# Patient Record
Sex: FEMALE | Race: WHITE | URBAN - METROPOLITAN AREA
[De-identification: names, ages, dates, MRNs, and addresses within clinical notes are randomized per-mention and may not be internally consistent; named-entity substitution may affect disease eponyms.]

---

## 2023-11-01 ENCOUNTER — OFFICE (OUTPATIENT)
Dept: URBAN - METROPOLITAN AREA CLINIC 103 | Facility: CLINIC | Age: 73
Setting detail: OPHTHALMOLOGY
End: 2023-11-01
Payer: MEDICARE

## 2023-11-01 DIAGNOSIS — H25.13: ICD-10-CM

## 2023-11-01 DIAGNOSIS — H02.834: ICD-10-CM

## 2023-11-01 DIAGNOSIS — H02.831: ICD-10-CM

## 2023-11-01 DIAGNOSIS — H16.223: ICD-10-CM

## 2023-11-01 DIAGNOSIS — H35.033: ICD-10-CM

## 2023-11-01 PROCEDURE — 92014 COMPRE OPH EXAM EST PT 1/>: CPT | Performed by: OPHTHALMOLOGY

## 2023-11-01 ASSESSMENT — LID POSITION - DERMATOCHALASIS
OD_DERMATOCHALASIS: RUL
OS_DERMATOCHALASIS: LUL

## 2023-11-01 ASSESSMENT — REFRACTION_CURRENTRX
OS_CYLINDER: -1.25
OS_AXIS: 083
OD_AXIS: 095
OS_SPHERE: +1.75
OD_ADD: +2.75
OS_OVR_VA: 20/
OD_SPHERE: +3.25
OD_CYLINDER: -2.00
OD_OVR_VA: 20/

## 2023-11-01 ASSESSMENT — SUPERFICIAL PUNCTATE KERATITIS (SPK)
OD_SPK: T
OS_SPK: T

## 2023-11-01 ASSESSMENT — SPHEQUIV_DERIVED
OS_SPHEQUIV: 1.375
OD_SPHEQUIV: 5.75

## 2023-11-01 ASSESSMENT — REFRACTION_AUTOREFRACTION
OD_AXIS: 094
OD_SPHERE: +7.00
OS_SPHERE: +2.00
OS_AXIS: 100
OD_CYLINDER: -2.50
OS_CYLINDER: -1.25

## 2023-11-01 ASSESSMENT — CONFRONTATIONAL VISUAL FIELD TEST (CVF)
OS_FINDINGS: FULL
OD_FINDINGS: FULL

## 2024-09-23 ENCOUNTER — APPOINTMENT (OUTPATIENT)
Dept: ORTHOPEDIC SURGERY | Facility: CLINIC | Age: 74
End: 2024-09-23
Payer: MEDICARE

## 2024-09-23 DIAGNOSIS — Z86.39 PERSONAL HISTORY OF OTHER ENDOCRINE, NUTRITIONAL AND METABOLIC DISEASE: ICD-10-CM

## 2024-09-23 DIAGNOSIS — Z78.9 OTHER SPECIFIED HEALTH STATUS: ICD-10-CM

## 2024-09-23 DIAGNOSIS — I10 ESSENTIAL (PRIMARY) HYPERTENSION: ICD-10-CM

## 2024-09-23 DIAGNOSIS — Z86.69 PERSONAL HISTORY OF OTHER DISEASES OF THE NERVOUS SYSTEM AND SENSE ORGANS: ICD-10-CM

## 2024-09-23 DIAGNOSIS — M25.511 PAIN IN RIGHT SHOULDER: ICD-10-CM

## 2024-09-23 PROBLEM — Z00.00 ENCOUNTER FOR PREVENTIVE HEALTH EXAMINATION: Status: ACTIVE | Noted: 2024-09-23

## 2024-09-23 PROCEDURE — 99204 OFFICE O/P NEW MOD 45 MIN: CPT

## 2024-09-23 RX ORDER — CYCLOBENZAPRINE HYDROCHLORIDE 5 MG/1
5 TABLET, FILM COATED ORAL
Qty: 30 | Refills: 0 | Status: ACTIVE | COMMUNITY
Start: 2024-09-23 | End: 1900-01-01

## 2024-09-23 RX ORDER — FLUTICASONE PROPIONATE 50 UG/1
50 SPRAY, METERED NASAL
Refills: 0 | Status: ACTIVE | COMMUNITY

## 2024-09-23 RX ORDER — METHIMAZOLE 5 MG/1
5 TABLET ORAL
Refills: 0 | Status: ACTIVE | COMMUNITY

## 2024-09-23 RX ORDER — DOXAZOSIN 4 MG/1
4 TABLET ORAL
Refills: 0 | Status: ACTIVE | COMMUNITY

## 2024-09-23 RX ORDER — ALPRAZOLAM 2 MG/1
TABLET ORAL
Refills: 0 | Status: ACTIVE | COMMUNITY

## 2024-09-23 RX ORDER — MELOXICAM 15 MG/1
15 TABLET ORAL DAILY
Qty: 30 | Refills: 2 | Status: ACTIVE | COMMUNITY
Start: 2024-09-23 | End: 1900-01-01

## 2024-09-23 RX ORDER — LOSARTAN POTASSIUM 50 MG/1
50 TABLET, FILM COATED ORAL
Refills: 0 | Status: ACTIVE | COMMUNITY

## 2024-09-23 RX ORDER — AMLODIPINE BESYLATE 5 MG/1
5 TABLET ORAL
Refills: 0 | Status: ACTIVE | COMMUNITY

## 2024-09-23 RX ORDER — ROSUVASTATIN CALCIUM 20 MG/1
20 TABLET, FILM COATED ORAL
Refills: 0 | Status: ACTIVE | COMMUNITY

## 2024-09-23 RX ORDER — CARVEDILOL 6.25 MG/1
6.25 TABLET, FILM COATED ORAL
Refills: 0 | Status: ACTIVE | COMMUNITY

## 2024-09-23 NOTE — IMAGING
[de-identified] : (Exam: Shoulder)   Laterality is right left   Patient is in no acute distress, alert and oriented Sensation is grossly intact to light touch in the hand Motor function is 5/5 in the hand Capillary refill is less than 2 seconds in the fingers Lymphadenopathy is not present Peripheral edema is not present   Skin is intact Swelling is not present Atrophy is not present Scapular winging is not present Deformity of the AC joint is not present Deformity of the biceps is not present   Bicipital groove tenderness is not present AC joint tenderness is not present Scapulothoracic tenderness is present   Active forward elevation is 175 Passive forward elevation is 175 External rotation at the side is 80 Internal rotation behind the back is to the level of T7   Forward elevation strength is 5/5 External rotation strength at the side is 5/5 Internal rotation strength at the side is 5/5 Deltoid strength of anterior, posterior and lateral heads is 5/5   Manriquez test is normal OBriens test is normal Empty can test is normal Speeds test is normal Cross body adduction test is normal Belly press test is normal Apprehension and relocation is normal Sulcus sign is normal

## 2024-09-23 NOTE — DISCUSSION/SUMMARY
[de-identified] : (Impression) -right shoulder periscapular strain   (Plan) -The diagnosis was explained in detail. The potential non-surgical and surgical treatments were reviewed. The relative risks and benefits of each option were considered relative to the patient age, activity level, medical history, symptom severity and previously attempted treatments. -The patient was advised to consult with their primary medical provider prior to initiation of any new medications to reduce the risk of adverse effects specific to their long-term home medications and medical history. The risk of gastrointestinal irritation and kidney injury specific to long-term NSAID use was discussed. -Physical therapy recommended for stretching and strengthening. -Meloxicam for pain and inflammation, take as needed. -Flexeril for muscle spasms and tightness, take as needed. -Referral to Dr. Cleary for trigger point injections.  -Follow up in 3 months, if symptoms persist consider subacromial injection.      ([facility]) Problem Complexity -Moderate: chronic illness with exacebation   Risk -Moderate: prescription medication   Visit Type -New: Patient has not been seen by another provider in my practice within the past 2 years who specializes in orthopedic surgery.

## 2024-09-23 NOTE — DATA REVIEWED
[MRI] : MRI [Right] : of the right [Shoulder] : shoulder [I independently reviewed and interpreted images and report] : I independently reviewed and interpreted images and report [FreeTextEntry1] : rotator cuff tendonitis, no high grade tear

## 2024-10-09 ENCOUNTER — APPOINTMENT (OUTPATIENT)
Dept: PHYSICAL MEDICINE AND REHAB | Facility: CLINIC | Age: 74
End: 2024-10-09

## 2025-01-06 ENCOUNTER — APPOINTMENT (OUTPATIENT)
Dept: ORTHOPEDIC SURGERY | Facility: CLINIC | Age: 75
End: 2025-01-06

## 2025-02-04 ENCOUNTER — OFFICE (OUTPATIENT)
Dept: URBAN - METROPOLITAN AREA CLINIC 103 | Facility: CLINIC | Age: 75
Setting detail: OPHTHALMOLOGY
End: 2025-02-04
Payer: COMMERCIAL

## 2025-02-04 DIAGNOSIS — H04.129: ICD-10-CM

## 2025-02-04 PROCEDURE — 92012 INTRM OPH EXAM EST PATIENT: CPT | Performed by: OPTOMETRIST

## 2025-02-04 ASSESSMENT — SUPERFICIAL PUNCTATE KERATITIS (SPK)
OD_SPK: 3+
OS_SPK: 3+

## 2025-02-04 ASSESSMENT — DECREASING TEAR LAKE - SEVERITY SCORE
OD_DEC_TEARLAKE: 3+
OS_DEC_TEARLAKE: 3+

## 2025-02-04 ASSESSMENT — TONOMETRY
OD_IOP_MMHG: 13
OS_IOP_MMHG: 14

## 2025-02-04 ASSESSMENT — LID POSITION - DERMATOCHALASIS
OD_DERMATOCHALASIS: RUL
OS_DERMATOCHALASIS: LUL

## 2025-02-07 ENCOUNTER — RX ONLY (RX ONLY)
Age: 75
End: 2025-02-07

## 2025-02-07 ASSESSMENT — REFRACTION_CURRENTRX
OD_AXIS: 103
OD_OVR_VA: 20/
OS_VPRISM_DIRECTION: PROGS
OD_ADD: +2.50
OD_SPHERE: +3.25
OS_ADD: +2.50
OD_VPRISM_DIRECTION: PROGS
OS_AXIS: 083
OS_CYLINDER: -1.25
OD_CYLINDER: -2.00
OS_SPHERE: +1.75
OS_OVR_VA: 20/

## 2025-02-07 ASSESSMENT — VISUAL ACUITY
OD_BCVA: 20/25-1
OS_BCVA: 20/150

## 2025-02-07 ASSESSMENT — KERATOMETRY
OD_K2POWER_DIOPTERS: 46.75
OD_K1POWER_DIOPTERS: 45.00
OS_K2POWER_DIOPTERS: 46.25
OS_K1POWER_DIOPTERS: 45.75
OD_AXISANGLE_DEGREES: 008
OS_AXISANGLE_DEGREES: 170

## 2025-02-07 ASSESSMENT — REFRACTION_AUTOREFRACTION
OD_AXIS: 093
OS_CYLINDER: -1.25
OS_AXIS: 098
OD_CYLINDER: -3.00
OS_SPHERE: +2.25
OD_SPHERE: +7.75

## 2025-02-27 ENCOUNTER — APPOINTMENT (OUTPATIENT)
Dept: ORTHOPEDIC SURGERY | Facility: CLINIC | Age: 75
End: 2025-02-27
Payer: COMMERCIAL

## 2025-02-27 VITALS — WEIGHT: 158 LBS | BODY MASS INDEX: 26.98 KG/M2 | HEIGHT: 64 IN

## 2025-02-27 DIAGNOSIS — M54.50 LOW BACK PAIN, UNSPECIFIED: ICD-10-CM

## 2025-02-27 DIAGNOSIS — M54.2 CERVICALGIA: ICD-10-CM

## 2025-02-27 DIAGNOSIS — M47.812 SPONDYLOSIS W/OUT MYELOPATHY OR RADICULOPATHY, CERVICAL REGION: ICD-10-CM

## 2025-02-27 DIAGNOSIS — M51.369: ICD-10-CM

## 2025-02-27 PROCEDURE — 72040 X-RAY EXAM NECK SPINE 2-3 VW: CPT

## 2025-02-27 PROCEDURE — 99204 OFFICE O/P NEW MOD 45 MIN: CPT

## 2025-02-27 PROCEDURE — 72100 X-RAY EXAM L-S SPINE 2/3 VWS: CPT
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
## 2025-02-27 NOTE — IMAGING
[de-identified] : Physical Exam     Constitutional: Well-groomed and developed. Respiratory: Normal, unlabored breathing. No use of accessory muscles. Skin: No rashes or ulcers. Skin warm and dry. Psychiatric: Oriented to time, place, person and event. No acute distress. Gait: Heel to toe Patient able to walk on toes and heels.   Neck: Posture: normal AROM: Flexion- chin to chest with pain Extension- full  R rotation- 45 L rotation-  45     Tenderness: Cervical: no midline tenderness on palpation + TTP trapezius bilaterally  Thoracic: No tenderness on palpation Lumbar: No tenderness on palpation No TTP thoracolumbar junction Sacrum/coccyx: no tenderness on palpation Greater trochanteric bursa: No tenderness PSIS: None     Motor: Laterality: BILATERAL UE: Deltoid 5/5 WE 5/5 Triceps 5/5 Biceps 5/5  5/5 Intrinsics 5/5     DTR: Laterality: BILATERAL Biceps: 2+ 2+ Brachioradialis: 2+ 2+ Triceps: 2+ 2+   Sensory: Light touch sensation intact on C5-T1 Spurling sign: Negative bilaterally Babinski's sign: Negative Bilaterally Randall's sign: Negative Bilaterally Abduction sign: Negative Bilaterally  Lumbar spine: Posture: Normal on coronal and sagittal alignment AROM: Flexion 50, with pain Extension 10 Moderate pain with simulated truncal motion   Tenderness: Thoracic: No tenderness on palpation Lumbar:  ++ TTP lumbar paraspinal  No midline TTP  Sacrum/coccyx: no tenderness on palpation Greater trochanteric bursa:  ++ Right GT TTP  PSIS: None   Motor:                          R             L LE:                             IS                    5             5 Quad              5             5 TA                   5             5 EHL                5             5 Gastroc          5             5                  R     L DTR: Patella    2+   2+ Achilles  2+   2+   Sensory: Light touch sensation intact T12-S1 Babinski's Sign: Negative bilaterally Straight leg raise test: Negative bilaterally BETTINA test: Negative bilaterally Facet loading: Negative bilaterally   02/27: X-rays of the lumbar is as follows: Lumbar 2 View AP/Lateral: Scoliosis noted. No fractures or listhesis noted. Straightening consistent with spasm. Diffuse degenerative changes noted throughout the lumbar spine, most notable at L4/L5, L5/S1 and facet arthropathy noted. Calcifications noted abdominal aorta.    AP/Lateral views of the cervical spine obtained. Demonstrate no fractures. No listhesis noted. Disc space narrowing consistent with DDD, most notable at C4/C5, C5/C6 and C6/C7.

## 2025-03-17 ENCOUNTER — APPOINTMENT (OUTPATIENT)
Dept: PHYSICAL MEDICINE AND REHAB | Facility: CLINIC | Age: 75
End: 2025-03-17

## 2025-04-09 ENCOUNTER — APPOINTMENT (OUTPATIENT)
Dept: PHYSICAL MEDICINE AND REHAB | Facility: CLINIC | Age: 75
End: 2025-04-09
Payer: COMMERCIAL

## 2025-04-09 VITALS — DIASTOLIC BLOOD PRESSURE: 80 MMHG | HEART RATE: 80 BPM | SYSTOLIC BLOOD PRESSURE: 122 MMHG

## 2025-04-09 VITALS — HEIGHT: 64 IN | WEIGHT: 155 LBS | BODY MASS INDEX: 26.46 KG/M2

## 2025-04-09 DIAGNOSIS — M54.16 RADICULOPATHY, LUMBAR REGION: ICD-10-CM

## 2025-04-09 DIAGNOSIS — M54.50 LOW BACK PAIN, UNSPECIFIED: ICD-10-CM

## 2025-04-09 DIAGNOSIS — M25.511 PAIN IN RIGHT SHOULDER: ICD-10-CM

## 2025-04-09 DIAGNOSIS — Z78.9 OTHER SPECIFIED HEALTH STATUS: ICD-10-CM

## 2025-04-09 DIAGNOSIS — Z86.39 PERSONAL HISTORY OF OTHER ENDOCRINE, NUTRITIONAL AND METABOLIC DISEASE: ICD-10-CM

## 2025-04-09 DIAGNOSIS — M54.6 PAIN IN THORACIC SPINE: ICD-10-CM

## 2025-04-09 DIAGNOSIS — M79.18 MYALGIA, OTHER SITE: ICD-10-CM

## 2025-04-09 DIAGNOSIS — M54.2 CERVICALGIA: ICD-10-CM

## 2025-04-09 DIAGNOSIS — M41.9 SCOLIOSIS, UNSPECIFIED: ICD-10-CM

## 2025-04-09 DIAGNOSIS — M24.9 JOINT DERANGEMENT, UNSPECIFIED: ICD-10-CM

## 2025-04-09 DIAGNOSIS — Z86.59 PERSONAL HISTORY OF OTHER MENTAL AND BEHAVIORAL DISORDERS: ICD-10-CM

## 2025-04-09 PROCEDURE — 99205 OFFICE O/P NEW HI 60 MIN: CPT
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
## 2025-04-09 NOTE — END OF VISIT
[FreeTextEntry3] :  This note was generated by using Dragon medical dictation software. A reasonable effort has been made for proofreading its contents, but typos may still remain. If there are any questions or points of clarification needed, please notify my office.

## 2025-04-09 NOTE — ASSESSMENT
[FreeTextEntry1] : Patient reports that she is recently initiated physical therapy treatment and is finding it beneficial.  It is recommended she continue with physical therapy to the CS and LS as per Dr. Roger. Moist heat for muscle relaxation Cervical pillow   Reviewed home exercise program with patient.  Stressed the importance of cervical spine range of motion trap and rhomboid stretching scapular mobilization isometrics to the C-spine, proper posturing and body mechanics as well as ergonomics Home exercise plan reviewed with patient. Stressed the importance for the need for frequent change in position from sit to stand and stand to sit, as well as use of weight shifting techniques to alleviate low back discomfort. Instruction in proper posturing, body mechanics and lifting techniques. Patient will be reevaluated in 4 weeks if symptoms persist will initiate a trial of acupuncture/TPI therapy to address her CS TS and LS complaints  Recheck in 4 weeks   At least 60 minutes was spent with patient face to face examining patient, reviewing findings/results, counseling patient and coordinating treatment program. Ample time was provided to answer any questions or address concerns to the patients satisfaction.

## 2025-04-09 NOTE — PHYSICAL EXAM
[FreeTextEntry1] : EXAMINATION OF THE CERVICAL SPINE AND UPPER EXTREMITIES:  Patient is alert and cooperative and answers all questions appropriately. Upon Inspection: LT scapula is higher in comparison to the RT  Cranial nerve testing was intact. Bilateral hearing aids, Smell and taste were not tested. Otherwise grossly intact. Visual fields were full. There was no difficulty with finger to nose response. Good rapid alternating digits of the hands bilaterally. Romberg testing was negative. There was no dysmetria of the upper extremities. Reflexes revealed 2 and symmetrical MMT U/E: Intact.  Sensory examination revealed sensation was intact. Vibratory and proprioceptive testing were intact. Peripheral pulses were palpable bilaterally. Randall Test was negative bilaterally. Tinels Test was negative at the wrists bilaterally. The Spurling Cervical Compression Test was negative. The Adsons Maneuver was negative bilaterally. No scapular winging was noted.  There was good scapular mobility.   Range of motion testing was performed with the use of a goniometer. On range of motion testing, Flexion was to 45 degrees (normal - 45) Extension was to 30 degrees (normal - 55) Left rotation was to 50 degrees (normal - 70) Right rotation was to 45 degrees (normal - 70) Left lateral bending was to 25 degrees (normal - 40) Right lateral bending was to 30 degrees (normal - 40)    On palpation, of the cervical spine there was tenderness and spasm involving bilateral paraspinal musculature. Trigger point bilateral trapezii. Tenderness and spasm bilateral levator scapulae musculature.    EXAMINATION OF THORACCO LUMBAR SPINE & LOWER EXTREMITIES:   Upon Inspection: Thoraco Lumbar Scoliosis   Reflexes (R) L/E:                   Quadriceps 2                   Achilles 2 Reflexes (L) L/E:                    Quadriceps 2                    Achilles 2   Sensory L/E: Intact, sensory anterior chest wall intact    Normal Gait   Testing: Patricks (R) [negative ]               Patricks (L) [negative ]               Babinski [ down going bilaterally]               Chaddock [ negative bilaterally]               Oppenheim [ negative bilaterally]               Gonda [ negative bilaterally]               No Ankle Clonus [bilaterally]               Leseagues (R) [negative ]               Leseagues (L) [negative ]               Kemps [negative ] SI Jt Lig.Challenege Test (R) positive SI Jt Lig.Challenege Test (L) positive S.L.R. (R) positive 50 degrees  S.L.R. (L) -70 degrees Range of motion testing was performed with the use of a goniometer.                           Flexion: 45 degrees (normal - 75-90)                           Extension: 15 degrees (normal - 30)                           Lateral Bending (R): 25 degrees (normal - 35)                           Lateral Bending (L): 20 degrees (normal - 35)                           Thoracic Rotation (R): 25 degrees (normal - 35)                           Thoracic Rotation (L): 20 degrees (normal - 35)                           Internal Rotation Femur (R): wnl                           External Rotation Femur (R):wnl                           Internal Rotation Femur (L): wnl                           External Rotation Femur (L): wnl  Vibratory: intact  Proprioception: intact  MMT: Intact  Palpation of the Lumbar Spine: Tenderness involving the L4-L5, L5-S1 interspace. Tenderness involving the bilateral SI joints. Tenderness and spasm involving bilateral lower lumbar paraspinal musculature. Trigger points bilateral gluteal musculature.  Tenderness involving the mid thoracic spine with associated thoracic paraspinal muscle spasm with greater involvement on the right.    [de-identified] : See Exam

## 2025-04-10 ENCOUNTER — APPOINTMENT (OUTPATIENT)
Dept: ORTHOPEDIC SURGERY | Facility: CLINIC | Age: 75
End: 2025-04-10
Payer: COMMERCIAL

## 2025-04-10 VITALS — WEIGHT: 155 LBS | HEIGHT: 64 IN | BODY MASS INDEX: 26.46 KG/M2

## 2025-04-10 DIAGNOSIS — M51.369: ICD-10-CM

## 2025-04-10 DIAGNOSIS — M47.812 SPONDYLOSIS W/OUT MYELOPATHY OR RADICULOPATHY, CERVICAL REGION: ICD-10-CM

## 2025-04-10 PROCEDURE — 99213 OFFICE O/P EST LOW 20 MIN: CPT

## 2025-04-10 NOTE — IMAGING
[de-identified] : Physical Exam - unchanged from previous examination    Constitutional: Well-groomed and developed. Respiratory: Normal, unlabored breathing. No use of accessory muscles. Skin: No rashes or ulcers. Skin warm and dry. Psychiatric: Oriented to time, place, person and event. No acute distress. Gait: Heel to toe Patient able to walk on toes and heels.   Neck: Posture: normal AROM: Flexion- chin to chest with pain Extension- full  R rotation- 45 L rotation-  45   Tenderness: Cervical: no midline tenderness on palpation + TTP trapezius bilaterally  Thoracic: No tenderness on palpation Lumbar: No tenderness on palpation No TTP thoracolumbar junction Sacrum/coccyx: no tenderness on palpation Greater trochanteric bursa: No tenderness PSIS: None     Motor: Laterality: BILATERAL UE: Deltoid 5/5 WE 5/5 Triceps 5/5 Biceps 5/5  5/5 Intrinsics 5/5   DTR: Laterality: BILATERAL Biceps: 2+ 2+ Brachioradialis: 2+ 2+ Triceps: 2+ 2+   Sensory: Light touch sensation intact on C5-T1 Spurling sign: Negative bilaterally Babinski's sign: Negative Bilaterally Randall's sign: Negative Bilaterally Abduction sign: Negative Bilaterally  Lumbar spine: Posture: Normal on coronal and sagittal alignment AROM: Flexion 50, with pain Extension 10 Moderate pain with simulated truncal motion   Tenderness: Thoracic: No tenderness on palpation Lumbar:  ++ TTP lumbar paraspinal  No midline TTP  Sacrum/coccyx: no tenderness on palpation Greater trochanteric bursa:  ++ Right GT TTP  PSIS: None   Motor:                          R             L LE:                             IS                    5             5 Quad              5             5 TA                   5             5 EHL                5             5 Gastroc          5             5                  R     L DTR: Patella    2+   2+ Achilles  2+   2+   Sensory: Light touch sensation intact T12-S1 Babinski's Sign: Negative bilaterally Straight leg raise test: Negative bilaterally BETTINA test: Negative bilaterally Facet loading: Negative bilaterally   02/27: X-rays of the lumbar is as follows: Lumbar 2 View AP/Lateral: Scoliosis noted. No fractures or listhesis noted. Straightening consistent with spasm. Diffuse degenerative changes noted throughout the lumbar spine, most notable at L4/L5, L5/S1 and facet arthropathy noted. Calcifications noted abdominal aorta.    AP/Lateral views of the cervical spine obtained. Demonstrate no fractures. No listhesis noted. Disc space narrowing consistent with DDD, most notable at C4/C5, C5/C6 and C6/C7.
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
## 2025-05-22 ENCOUNTER — APPOINTMENT (OUTPATIENT)
Dept: ORTHOPEDIC SURGERY | Facility: CLINIC | Age: 75
End: 2025-05-22
Payer: COMMERCIAL

## 2025-05-22 VITALS — BODY MASS INDEX: 26.46 KG/M2 | WEIGHT: 155 LBS | HEIGHT: 64 IN

## 2025-05-22 PROCEDURE — 99213 OFFICE O/P EST LOW 20 MIN: CPT
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
## 2025-05-22 NOTE — IMAGING
[de-identified] : Physical Exam - unchanged from previous examination    Constitutional: Well-groomed and developed. Respiratory: Normal, unlabored breathing. No use of accessory muscles. Skin: No rashes or ulcers. Skin warm and dry. Psychiatric: Oriented to time, place, person and event. No acute distress. Gait: walking with no assistive device    Neck: Posture: normal AROM: Flexion- chin to chest with pain Extension- full  R rotation- 45 L rotation- 45   Tenderness: Cervical: no midline tenderness on palpation + TTP trapezius bilaterally  Thoracic: No tenderness on palpation Lumbar: No tenderness on palpation No TTP thoracolumbar junction Sacrum/coccyx: no tenderness on palpation Greater trochanteric bursa: No tenderness PSIS: None     Motor: Laterality: BILATERAL UE: Deltoid 5/5 WE 5/5 Triceps 5/5 Biceps 5/5  5/5 Intrinsics 5/5   DTR: Laterality: BILATERAL Biceps: 2+ 2+ Brachioradialis: 2+ 2+ Triceps: 2+ 2+   Sensory: Light touch sensation intact on C5-T1 Spurling sign: Negative bilaterally Babinski's sign: Negative Bilaterally Randall's sign: Negative Bilaterally Abduction sign: Negative Bilaterally  Lumbar spine: Posture: Normal on coronal and sagittal alignment AROM: Flexion 50, with pain Extension 10 Moderate pain with simulated truncal motion   Tenderness: Thoracic: No tenderness on palpation Lumbar:  - TTP lumbar paraspinal, improved  No midline TTP  Sacrum/coccyx: no tenderness on palpation Greater trochanteric bursa:  + Right GT TTP  PSIS: None   Motor:                          R             L LE:                             IS                    5             5 Quad              5             5 TA                   5             5 EHL                5             5 Gastroc          5             5                  R     L DTR: Patella    2+   2+ Achilles  2+   2+   Sensory: Light touch sensation intact T12-S1 Babinski's Sign: Negative bilaterally Straight leg raise test: Negative bilaterally BETTINA test: Negative bilaterally Facet loading: Negative bilaterally   02/27: X-rays of the lumbar is as follows: Lumbar 2 View AP/Lateral: Scoliosis noted. No fractures or listhesis noted. Straightening consistent with spasm. Diffuse degenerative changes noted throughout the lumbar spine, most notable at L4/L5, L5/S1 and facet arthropathy noted. Calcifications noted abdominal aorta.    AP/Lateral views of the cervical spine obtained. Demonstrate no fractures. No listhesis noted. Disc space narrowing consistent with DDD, most notable at C4/C5, C5/C6 and C6/C7.
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
## 2025-05-28 ENCOUNTER — APPOINTMENT (OUTPATIENT)
Dept: PHYSICAL MEDICINE AND REHAB | Facility: CLINIC | Age: 75
End: 2025-05-28
Payer: COMMERCIAL

## 2025-05-28 DIAGNOSIS — M54.6 PAIN IN THORACIC SPINE: ICD-10-CM

## 2025-05-28 DIAGNOSIS — M79.18 MYALGIA, OTHER SITE: ICD-10-CM

## 2025-05-28 DIAGNOSIS — M25.511 PAIN IN RIGHT SHOULDER: ICD-10-CM

## 2025-05-28 DIAGNOSIS — M24.9 JOINT DERANGEMENT, UNSPECIFIED: ICD-10-CM

## 2025-05-28 DIAGNOSIS — M41.9 SCOLIOSIS, UNSPECIFIED: ICD-10-CM

## 2025-05-28 DIAGNOSIS — M51.369: ICD-10-CM

## 2025-05-28 DIAGNOSIS — M47.812 SPONDYLOSIS W/OUT MYELOPATHY OR RADICULOPATHY, CERVICAL REGION: ICD-10-CM

## 2025-05-28 DIAGNOSIS — M54.2 CERVICALGIA: ICD-10-CM

## 2025-05-28 PROCEDURE — 99213 OFFICE O/P EST LOW 20 MIN: CPT

## 2025-05-28 NOTE — ASSESSMENT
[FreeTextEntry1] : PT 2-3xweekly/y4trrze - C/S MH, ES, DTM - C/S  Trapezii and rhomboid stretch and strengthening  Scapular mobilization Isometrics: C/S Upper extremities PRE'S advanced as tolerated.  Instruction in proper posturing and body mechanics. Instruction in HEP. PT 2-3x WEEKLY/X6 WEEKS - L/S MH, ES, DTM - L/S & GLUTEAL MUSCULATURE PASQUALE FLEXION GLUTEAL & PIRIFORMS STRETCHING QUAD HAMSTRING STRETCHING & STRENGTHENING  CORE AND L/E- PRE'S INSTRUCTION IN PROPER BODY MECHANICS AND POSTURING. INSTRUCTION IN HEP.   Reviewed home exercise program with patient.  Stressed the importance of cervical spine range of motion trap and rhomboid stretching scapular mobilization isometrics to the C-spine, proper posturing and body mechanics as well as ergonomics Home exercise plan reviewed with patient. Stressed the importance for the need for frequent change in position from sit to stand and stand to sit, as well as use of weight shifting techniques to alleviate low back discomfort. Instruction in proper posturing, body mechanics and lifting techniques. Patient will be reevaluated in 4 weeks if symptoms persist will initiate a trial of acupuncture/TPI therapy to address her CS TS and LS complaints  Recheck in 4 weeks   At least 20 minutes was spent with patient face to face examining patient, reviewing findings/results, counseling patient and coordinating treatment program. Ample time was provided to answer any questions or address concerns to the patient's satisfaction.

## 2025-05-28 NOTE — HISTORY OF PRESENT ILLNESS
[FreeTextEntry1] :  patient reports overall making progress with physical therapy as relates to her cervical spine and lumbar spine.  However, she continues to complain of low back pain with radicular symptoms involving her right lower extremity.  She reports pain is aggravated with prolonged sitting standing and/or ambulation.  Patient presents today for reevaluation.

## 2025-05-28 NOTE — PHYSICAL EXAM
[FreeTextEntry1] : EXAMINATION OF THE CERVICAL SPINE AND UPPER EXTREMITIES: Reflexes revealed 2 and symmetrical MMT U/E: Intact.  Sensory examination revealed sensation was intact. The Spurling Cervical Compression Test was negative. The Adsons Maneuver was negative bilaterally. No scapular winging was noted.  There was good scapular mobility.   Range of motion testing was performed with the use of a goniometer. On range of motion testing, Flexion was to 45 degrees (normal - 45) Extension was to 35 degrees (normal - 55) Left rotation was to 60 degrees (normal - 70) Right rotation was to 55 degrees (normal - 70) Left lateral bending was to 25 degrees (normal - 40) Right lateral bending was to 30 degrees (normal - 40)    On palpation, of the cervical spine there was decreasing tenderness and spasm involving bilateral paraspinal musculature.  EXAMINATION OF THORACCO LUMBAR SPINE & LOWER EXTREMITIES:   Upon Inspection: Thoraco Lumbar Scoliosis   Reflexes (R) L/E:                   Quadriceps 2                   Achilles 2 Reflexes (L) L/E:                    Quadriceps 2                    Achilles 2   Sensory L/E:  decreased sensation right L5 dermatome SI Jt Lig.Challenege Test (R) positive SI Jt Lig.Challenege Test (L) positive S.L.R. (R) positive 50 degrees  S.L.R. (L) -70 degrees Range of motion testing was performed with the use of a goniometer.                           Flexion: 50 degrees (normal - 75-90)                           Extension: 15 degrees (normal - 30)                           Lateral Bending (R): 25 degrees (normal - 35)                           Lateral Bending (L): 25 degrees (normal - 35)                           Thoracic Rotation (R): 30 degrees (normal - 35)                           Thoracic Rotation (L): 20 degrees (normal - 35)                          MMT: Intact  Palpation of the Lumbar Spine:  Tenderness and spasm bilateral lumbar paraspinal musculature trigger points bilateral gluteal musculature with greater involvement on the right    [de-identified] : See Exam

## 2025-06-05 ENCOUNTER — APPOINTMENT (OUTPATIENT)
Dept: PHYSICAL MEDICINE AND REHAB | Facility: CLINIC | Age: 75
End: 2025-06-05
Payer: COMMERCIAL

## 2025-06-05 DIAGNOSIS — M79.18 MYALGIA, OTHER SITE: ICD-10-CM

## 2025-06-05 DIAGNOSIS — M54.50 LOW BACK PAIN, UNSPECIFIED: ICD-10-CM

## 2025-06-05 DIAGNOSIS — M54.16 RADICULOPATHY, LUMBAR REGION: ICD-10-CM

## 2025-06-05 PROBLEM — M79.10 MYALGIA: Status: ACTIVE | Noted: 2025-06-05

## 2025-06-05 PROCEDURE — 99213 OFFICE O/P EST LOW 20 MIN: CPT | Mod: 25

## 2025-06-05 PROCEDURE — 20553 NJX 1/MLT TRIGGER POINTS 3/>: CPT

## 2025-06-09 ENCOUNTER — APPOINTMENT (OUTPATIENT)
Dept: PHYSICAL MEDICINE AND REHAB | Facility: CLINIC | Age: 75
End: 2025-06-09
Payer: COMMERCIAL

## 2025-06-09 PROCEDURE — 99213 OFFICE O/P EST LOW 20 MIN: CPT | Mod: 25

## 2025-06-09 PROCEDURE — 20553 NJX 1/MLT TRIGGER POINTS 3/>: CPT

## 2025-06-09 NOTE — HISTORY OF PRESENT ILLNESS
[FreeTextEntry1] : Patient reports she tolerated first session of TPI therapy well.  Notes some decreased pain and spasm involving left gluteal musculature.  Presents today for reevaluation.  She currently denies radicular symptoms involving her lower extremities.

## 2025-06-09 NOTE — PHYSICAL EXAM
[FreeTextEntry1] : EXAMINATION OF THORACCO LUMBAR SPINE & LOWER EXTREMITIES:   Upon Inspection: Thoraco Lumbar Scoliosis   Reflexes (R) L/E:                   Quadriceps 2                   Achilles 2 Reflexes (L) L/E:                    Quadriceps 2                    Achilles 2   Sensory L/E:  decreased sensation right L5 dermatome SI Jt Lig.Challenege Test (R) positive SI Jt Lig.Challenege Test (L) positive S.L.R. (R) -70 degrees  S.L.R. (L) -70 degrees Range of motion testing was performed with the use of a goniometer.                           Flexion: 55 degrees (normal - 75-90)                           Extension: 15 degrees (normal - 30)                           Lateral Bending (R): 30 degrees (normal - 35)                           Lateral Bending (L): 30 degrees (normal - 35)                           Thoracic Rotation (R): 30 degrees (normal - 35)                           Thoracic Rotation (L): 20 degrees (normal - 35)                          MMT: Intact  Palpation of the Lumbar Spine: Isolated trigger points identified involving the left gluteus twin, left gluteus medius, left piriformis musculature Patient presents for evaluation of Lumbar Spine pain.    After today's examination additional trigger points were identified involving the left gluteus twin, left gluteus medius, left piriformis musculature, thus indicating the need for additional trigger point therapy. Goals of which are to decrease pain, dissipate muscle spasm, increase ROM and improve level of function.     Sterile Technique Injection 2 Syringes of 5 cc 1 % Lidocaine HCL left gluteus twin, left gluteus medius, left piriformis musculature     Ice injection site PRN   Injection tolerated well.     Multiple areas were identified using palpation guidance. Using aseptic technique, each of these areas left gluteus twin, left gluteus medius, left piriformis musculature were isolated and the skin prepped with alcohol.  A 22 gauge 1 1/2 inch needle was inserted to the level of the muscle. At this point, a slight twitch was elicited and in some cases the patient identified referred pain to areas distant from the injection site.  All of these were consistent with the definition of a trigger point.   Aspiration revealed no blood and a mixture of 5cc 1 % Lidocaine HCL was injected in increments of 3-4 mL into each of these areas for a total of 3 sites. The needle was removed. Hemostasis was achieved with direct pressure. The patient tolerated the procedure well. Post-procedure exam did not reveal any new neurological deficits. The patient was instructed to call with fever, chills, increased pain, redness or swelling at the injection site, or numbness or weakness in the lower extremities. The patient was discharged home in good condition with post-procedural instructions.    At least 20 minutes was spent with patient face to face examining patient, reviewing findings/results, counseling patient and coordinating treatment program. Ample time was provided to answer any questions or address concerns to the patient's satisfaction.   Recheck 1- 2 weeks. To assess clinical response to TPI therapy and need for ongoing treatment.     [de-identified] : See Exam

## 2025-06-11 ENCOUNTER — RESULT REVIEW (OUTPATIENT)
Age: 75
End: 2025-06-11

## 2025-06-16 ENCOUNTER — APPOINTMENT (OUTPATIENT)
Dept: PHYSICAL MEDICINE AND REHAB | Facility: CLINIC | Age: 75
End: 2025-06-16

## 2025-06-23 ENCOUNTER — APPOINTMENT (OUTPATIENT)
Dept: PHYSICAL MEDICINE AND REHAB | Facility: CLINIC | Age: 75
End: 2025-06-23

## 2025-06-30 ENCOUNTER — APPOINTMENT (OUTPATIENT)
Dept: PHYSICAL MEDICINE AND REHAB | Facility: CLINIC | Age: 75
End: 2025-06-30

## 2025-07-07 ENCOUNTER — APPOINTMENT (OUTPATIENT)
Dept: PHYSICAL MEDICINE AND REHAB | Facility: CLINIC | Age: 75
End: 2025-07-07

## 2025-07-09 ENCOUNTER — APPOINTMENT (OUTPATIENT)
Dept: ORTHOPEDIC SURGERY | Facility: CLINIC | Age: 75
End: 2025-07-09
Payer: COMMERCIAL

## 2025-07-09 VITALS — WEIGHT: 155 LBS | HEIGHT: 64 IN | BODY MASS INDEX: 26.46 KG/M2

## 2025-07-09 PROBLEM — M51.369 DISC DEGENERATION, LUMBAR: Status: ACTIVE | Noted: 2025-07-09

## 2025-07-09 PROCEDURE — 99213 OFFICE O/P EST LOW 20 MIN: CPT

## 2025-07-09 NOTE — IMAGING
[de-identified] : Physical Exam    Constitutional: Well-groomed and developed. Respiratory: Normal, unlabored breathing. No use of accessory muscles. Skin: No rashes or ulcers. Skin warm and dry. Psychiatric: Oriented to time, place, person and event. No acute distress. Gait: walking with no assistive device    Lumbar spine: Posture: Normal on coronal and sagittal alignment AROM: Flexion 50, with pain Extension 10 Moderate pain with simulated truncal motion   Tenderness: Thoracic: No tenderness on palpation Lumbar:  ++ TTP lumbar paraspinal right sided, lumbosacral  No midline TTP  Sacrum/coccyx: no tenderness on palpation Greater trochanteric bursa:  + Right GT TTP  PSIS: None   Motor:                          R             L LE:                             IS                    5             5 Quad              5             5 TA                   5             5 EHL                5             5 Gastroc          5             5                  R     L DTR: Patella    2+   2+ Achilles  2+   2+   Sensory: Light touch sensation intact T12-S1 Babinski's Sign: Negative bilaterally Straight leg raise test: Negative bilaterally BETTINA test: Negative bilaterally Facet loading: Negative bilaterally   MRI of lumbar spine independently reviewed and interpreted from [facility] on 06/12 IMPRESSION: Partially sacralized L5. Please make note of this transitional anatomy and carefully refer to imaging prior to any planned intervention. Moderate thoracolumbar levoscoliosis. Multilevel lumbar disc pathology with varying degrees of stenosis as detailed below. L1-L2: There is a disc bulge without significant central stenosis. There is a right foraminal disc herniation resulting in severe right foraminal stenosis and impingement of the exiting right L1 nerve root. L2-L3: There is a disc bulge resulting in mild central and mild bilateral foraminal stenosis. There is mild facet arthropathy. L3-L4: There is grade 1 anterolisthesis of L3 on L4 and severe facet arthropathy. There is superimposed disc bulging with moderate central stenosis and mild bilateral foraminal stenosis. L4-L5: Type 2 Modic endplate changes are present. There is disc height loss, disc bulging and severe left-sided facet arthropathy. There is mild central stenosis. Mild right and severe left foraminal stenosis is noted with impingement of the exiting left L4 nerve root.  02/27: X-rays of the lumbar is as follows: Lumbar 2 View AP/Lateral: Scoliosis noted. No fractures or listhesis noted. Straightening consistent with spasm. Diffuse degenerative changes noted throughout the lumbar spine, most notable at L4/L5, L5/S1 and facet arthropathy noted. Calcifications noted abdominal aorta.    AP/Lateral views of the cervical spine obtained. Demonstrate no fractures. No listhesis noted. Disc space narrowing consistent with DDD, most notable at C4/C5, C5/C6 and C6/C7.

## 2025-07-09 NOTE — IMAGING
[de-identified] : Physical Exam    Constitutional: Well-groomed and developed. Respiratory: Normal, unlabored breathing. No use of accessory muscles. Skin: No rashes or ulcers. Skin warm and dry. Psychiatric: Oriented to time, place, person and event. No acute distress. Gait: walking with no assistive device    Lumbar spine: Posture: Normal on coronal and sagittal alignment AROM: Flexion 50, with pain Extension 10 Moderate pain with simulated truncal motion   Tenderness: Thoracic: No tenderness on palpation Lumbar:  ++ TTP lumbar paraspinal right sided, lumbosacral  No midline TTP  Sacrum/coccyx: no tenderness on palpation Greater trochanteric bursa:  + Right GT TTP  PSIS: None   Motor:                          R             L LE:                             IS                    5             5 Quad              5             5 TA                   5             5 EHL                5             5 Gastroc          5             5                  R     L DTR: Patella    2+   2+ Achilles  2+   2+   Sensory: Light touch sensation intact T12-S1 Babinski's Sign: Negative bilaterally Straight leg raise test: Negative bilaterally BETTINA test: Negative bilaterally Facet loading: Negative bilaterally   MRI of lumbar spine independently reviewed and interpreted from [facility] on 06/12 IMPRESSION: Partially sacralized L5. Please make note of this transitional anatomy and carefully refer to imaging prior to any planned intervention. Moderate thoracolumbar levoscoliosis. Multilevel lumbar disc pathology with varying degrees of stenosis as detailed below. L1-L2: There is a disc bulge without significant central stenosis. There is a right foraminal disc herniation resulting in severe right foraminal stenosis and impingement of the exiting right L1 nerve root. L2-L3: There is a disc bulge resulting in mild central and mild bilateral foraminal stenosis. There is mild facet arthropathy. L3-L4: There is grade 1 anterolisthesis of L3 on L4 and severe facet arthropathy. There is superimposed disc bulging with moderate central stenosis and mild bilateral foraminal stenosis. L4-L5: Type 2 Modic endplate changes are present. There is disc height loss, disc bulging and severe left-sided facet arthropathy. There is mild central stenosis. Mild right and severe left foraminal stenosis is noted with impingement of the exiting left L4 nerve root.  02/27: X-rays of the lumbar is as follows: Lumbar 2 View AP/Lateral: Scoliosis noted. No fractures or listhesis noted. Straightening consistent with spasm. Diffuse degenerative changes noted throughout the lumbar spine, most notable at L4/L5, L5/S1 and facet arthropathy noted. Calcifications noted abdominal aorta.    AP/Lateral views of the cervical spine obtained. Demonstrate no fractures. No listhesis noted. Disc space narrowing consistent with DDD, most notable at C4/C5, C5/C6 and C6/C7.
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
## 2025-07-14 ENCOUNTER — APPOINTMENT (OUTPATIENT)
Dept: PHYSICAL MEDICINE AND REHAB | Facility: CLINIC | Age: 75
End: 2025-07-14